# Patient Record
(demographics unavailable — no encounter records)

---

## 2025-06-23 NOTE — PHYSICAL EXAM
[Alert] : alert [Playful] : playful [Ulcerative Lesions] : ulcerative lesions [NL] : warm, clear [de-identified] : Maculopapular rash with crusting lesion to arms, trunk and legs and soles of feet.

## 2025-06-23 NOTE — DISCUSSION/SUMMARY
[FreeTextEntry1] : 12mo F presenting for evaluation of rash since yesterday. Vitals reviewed, afebrile and age appropriate. Physical exam remarkable for well appearing child, well hydrated and interactive. Rash to arms, legs, trunk and soles of feet consistent with Hand Foot Mouth Disease secondary to a common pediatric virus. No medications at this time. Discussed normal course of virus and rash which can last 7 to 10 days. Advised Tylenol and/or Motrin for pain or fever. Analgesic may alleviate oral lesions and assist with oral hydration. Strict return precaution discussed if worsening rash, changes to PO intake or activity levels.   Mother and father agree with aforementioned plan. All questions answered. No further questions at this time.

## 2025-06-23 NOTE — HISTORY OF PRESENT ILLNESS
[de-identified] : Rash [FreeTextEntry6] : 12mo F presenting for evalution of rash since yesterday. Per parents, noted rash Sunday, rash present on arms and legs. Does note some on feet as well. Mother endorses child has pain of throat. Denies any fevers, nausea, vomiting or diarrhea. She is maintaining adequate hydration and PO intake. Activity levels at baseline. No sick contacts or day care exposure. Mother endorses rash is overall improving.

## 2025-07-07 NOTE — DISCUSSION/SUMMARY
[FreeTextEntry1] :  12 month old F with a history of atopic dermatitis presenting for HCM. Growth and development normal. PO intake and expulsion per baseline. PE remarkable for eczema over the cheeks b/l and mild skin peeling over the palms b/l after a recent Coxsackie infection. Immunizations UTD.  PLAN - Routine care & anticipatory guidance given - CBC & lead to be done - Vaccines given: MMR, Varicella & Hep A - Post vaccine care discussed & potential side effects reviewed - Tylenol 3.5mL every 4 hours prn or Motrin 3.5mL every 6 hours prn for pain or fever - Counseled on introduction of cow's milk & possibility of temporary constipation during transitioning, may use prune juice for relief - Choking hazards reviewed - Referred to dental for routine screen, may brush teeth with toothbrush and smear of fluoridated toothpaste - RTC for 15 month old HCM and prn  ECZEMA continue recommend daily moisturizer and topical steroid as needed. Side effect of topical steroids includes but not limited to lightening of skin. Avoid synthetic clothing. Bathe every 2-3 days, avoiding hot water.  Sleep with cool mist humidifier.  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 All questions were answered.

## 2025-07-07 NOTE — PHYSICAL EXAM
[Alert] : alert [Playful] : playful [Normocephalic] : normocephalic [Closed Anterior [place]] : closed anterior fontanelle [Red Reflex] : red reflex bilateral [PERRL] : PERRL [Normally Placed Ears] : normally placed ears [Auricles Well Formed] : auricles well formed [Clear Tympanic membranes] : clear tympanic membranes [Light reflex present] : light reflex present [Bony landmarks visible] : bony landmarks visible [Nares Patent] : nares patent [Palate Intact] : palate intact [Uvula Midline] : uvula midline [Tooth Eruption] : tooth eruption [Supple, full passive range of motion] : supple, full passive range of motion [Symmetric Chest Rise] : symmetric chest rise [Clear to Auscultation Bilaterally] : clear to auscultation bilaterally [Regular Rate and Rhythm] : regular rate and rhythm [S1, S2 present] : S1, S2 present [+2 Femoral Pulses] : (+) 2 femoral pulses [Soft] : soft [Bowel Sounds] : normoactive bowel sounds [Normal External Genitalia] : normal external genitalia [Normal Vaginal Introitus] : normal vaginal introitus [No Abnormal Lymph Nodes Palpated] : no abnormal lymph nodes palpated [Symmetric Abduction and Rotation of Hips] : symmetric abduction and rotation of hips [Straight] : straight [Cranial Nerves Grossly Intact] : cranial nerves grossly intact [Rash or Lesions] : rash and/or lesion present [Discharge] : no discharge [Palpable Masses] : no palpable masses [Murmurs] : no murmurs [Tender] : nontender [Distended] : nondistended [Hepatomegaly] : no hepatomegaly [Splenomegaly] : no splenomegaly [Clitoromegaly] : no clitoromegaly [Allis Sign] : negative Allis sign [de-identified] : (+) eczema over cheeks b/l, (+) mild skin peeling over palms b/l

## 2025-07-07 NOTE — HISTORY OF PRESENT ILLNESS
[Mother] : mother [Formula ___ oz/feed] : [unfilled] oz of formula per feed [Normal] : Normal [Fruit] : fruit [Vegetables] : vegetables [Meat] : meat [Dairy] : dairy [Table food] : table food [___ stools per day] : [unfilled]  stools per day [___ voids per day] : [unfilled] voids per day [Pacifier use] : Pacifier use [Sippy cup use] : Sippy cup use [Brushing teeth] : Brushing teeth [No] : Patient does not go to dentist yearly [Toothpaste] : Primary Fluoride Source: Toothpaste [Playtime] : Playtime  [Water heater temperature set at <120 degrees F] : Water heater temperature set at <120 degrees F [Car seat in back seat] : Car seat in back seat [Smoke Detectors] : Smoke detectors [Carbon Monoxide Detectors] : Carbon monoxide detectors [Up to date] : Up to date [Exposure to electronic nicotine delivery system] : No exposure to electronic nicotine delivery system [At risk for exposure to TB] : Not at risk for exposure to Tuberculosis [FreeTextEntry7] : Coxsackie virus on 6/23/25, now resolved with some mild skin peeling over palms. Atopic dermatitis improving with Aquaphor.  [de-identified] : none [de-identified] : Nido formula [FreeTextEntry1] :  12 month old F with a history of atopic dermatitis presenting for [facility]. Patient was recently seen 6/23 for a Coxsakie infection which has now improved. Mother reports that the patient has returned to baseline, however continues to have some mild skin peeling over her palms and soles b/l, but does not concern her. Mother also states patient's eczema has improved after applying Triamcinolone cream and Aquaphor as prescribed. Mother states she continues the Aquaphor daily. Activity level is reported to be appropriate. PO intake and expulsion per baseline. Mother continues to avid pineapple for concerns of possible allergy. Mother otherwise has no concerns at this visit and states patient has been doing well.   SDOH Screening Questionnaire  SDOH (Social Determinants of Health) Questionnaire: 1. Housing: Do you worry that in the upcoming months, your family, or child, may not have a safe or stable place to live? no 2. Food security: Within the last 12 months, did the food you bought not last and you did not have money to buy more? no 3. Community: Do you need help getting public benefits like food stamps or WIC? no 4. Transportation: Does your child have chronic medical condition and therefore struggle with transportation to attend medical appointments? no 5. Healthcare Access: Do you need help getting health or dental insurance? no    Result: Negative Screen. No further intervention needed.

## 2025-07-16 NOTE — CONSULT LETTER
[Dear  ___] : Dear  [unfilled], [Please see my note below.] : Please see my note below. [Sincerely,] : Sincerely, [FreeTextEntry1] : Thank you for sending  MILTON BHARATH  to me for neurological evaluation. This is an initial encounter with a new pt.  [FreeTextEntry3] : Dr Carvalho

## 2025-07-16 NOTE — HISTORY OF PRESENT ILLNESS
[FreeTextEntry1] : 13 month old female with decelerating head growth in past few months. Pt smiles, laughs, fixates, tracks, says a word or two, has good eye contact, cruises, starting to walk. No vomiting or irritability. PMH -ve. On no meds. NKA.,. FMH -ve for epilepsy or ASD. Birth: 37 wk GA  no complications.  used ( [facility], ID#: [id number]).

## 2025-07-16 NOTE — DISCUSSION/SUMMARY
[FreeTextEntry1] : Microcephaly with intact neurological and developmental; examination. Will get skull X-ray to rule out craniosynostosis. RTO in 3 months. Note sent to Dr Mcelroy(PCP). Total clinician time spent on 7/16/25 is 48 minutes including preparing to see the patient, obtaining and/or reviewing and confirming history, performing a medically necessary and appropriate examination, counseling and educating the patient and/or family, documenting clinical information in the EHR and communicating and/or referring to other healthcare professionals.